# Patient Record
Sex: FEMALE | URBAN - METROPOLITAN AREA
[De-identification: names, ages, dates, MRNs, and addresses within clinical notes are randomized per-mention and may not be internally consistent; named-entity substitution may affect disease eponyms.]

---

## 2019-12-20 ENCOUNTER — IMPORTED ENCOUNTER (OUTPATIENT)
Dept: URBAN - METROPOLITAN AREA CLINIC 1 | Facility: CLINIC | Age: 44
End: 2019-12-20

## 2019-12-20 PROBLEM — H52.223: Noted: 2019-12-20

## 2019-12-20 PROBLEM — H52.13: Noted: 2019-12-20

## 2019-12-20 PROCEDURE — S0620 ROUTINE OPHTHALMOLOGICAL EXA: HCPCS

## 2019-12-20 NOTE — PATIENT DISCUSSION
1. Myopia: Rx was given for correction if indicated and requested. 2. Astigmatism OU- MRX finalized today and CTL trials given. Return for an appointment in 2 weeks CC with Dr. Dukes.

## 2021-06-05 ENCOUNTER — IMPORTED ENCOUNTER (OUTPATIENT)
Dept: URBAN - METROPOLITAN AREA CLINIC 1 | Facility: CLINIC | Age: 46
End: 2021-06-05

## 2021-06-05 PROBLEM — H01.001: Noted: 2021-06-05

## 2021-06-05 PROBLEM — H01.004: Noted: 2021-06-05

## 2021-06-05 PROBLEM — H52.223: Noted: 2021-06-05

## 2021-06-05 PROBLEM — H44.23: Noted: 2021-06-05

## 2021-06-05 PROBLEM — H52.4: Noted: 2021-06-05

## 2021-06-05 PROCEDURE — S0621 ROUTINE OPHTHALMOLOGICAL EXA: HCPCS

## 2021-06-05 NOTE — PATIENT DISCUSSION
1. Myopia- Rx was given for correction if indicated and requested. 2. Astigmatism OU3. Presbyopia4. HANK OU- Recommend the frequent use of AT's BID OU routinely. (sample given of Refresh Relivea for CTL)5. Anterior Blepharitis OU- Daily Hot compresses and lid scrubs were recommended. Finalized CTL MRx today (Consider Ultra Toric MF)Return for an appointment in 1 year 40/CC with Dr. Michaela Villeda.

## 2022-04-02 ASSESSMENT — VISUAL ACUITY
OS_SC: 20/20
OS_SC: 20/20
OD_SC: 20/20
OS_CC: J1+
OD_SC: 20/20
OS_CC: J1
OD_CC: J1
OD_CC: J1+

## 2022-04-02 ASSESSMENT — TONOMETRY
OS_IOP_MMHG: 16
OS_IOP_MMHG: 17
OD_IOP_MMHG: 16
OD_IOP_MMHG: 16

## 2022-09-06 ENCOUNTER — COMPREHENSIVE EXAM (OUTPATIENT)
Dept: URBAN - METROPOLITAN AREA CLINIC 1 | Facility: CLINIC | Age: 47
End: 2022-09-06

## 2022-09-06 DIAGNOSIS — H52.223: ICD-10-CM

## 2022-09-06 DIAGNOSIS — H52.4: ICD-10-CM

## 2022-09-06 DIAGNOSIS — H44.23: ICD-10-CM

## 2022-09-06 DIAGNOSIS — Z01.00: ICD-10-CM

## 2022-09-06 PROCEDURE — 92015 DETERMINE REFRACTIVE STATE: CPT

## 2022-09-06 PROCEDURE — 92014 COMPRE OPH EXAM EST PT 1/>: CPT

## 2022-09-06 PROCEDURE — 92310 CONTACT LENS FITTING OU: CPT

## 2022-09-06 ASSESSMENT — VISUAL ACUITY
OD_CC: 20/20
OD_CC: J2
OS_CC: 20/20
OS_CC: J2

## 2022-09-06 ASSESSMENT — TONOMETRY
OS_IOP_MMHG: 16
OD_IOP_MMHG: 16

## 2022-09-06 NOTE — PATIENT DISCUSSION
Patient given Rx for glasses and contacts. Finalized Biofinity Toric CTL today. Patient happy with Comfort and VA & ok with wearing Readers over CTL's.

## 2023-09-07 ENCOUNTER — COMPREHENSIVE EXAM (OUTPATIENT)
Dept: URBAN - METROPOLITAN AREA CLINIC 1 | Facility: CLINIC | Age: 48
End: 2023-09-07

## 2023-09-07 DIAGNOSIS — H52.13: ICD-10-CM

## 2023-09-07 DIAGNOSIS — H52.223: ICD-10-CM

## 2023-09-07 DIAGNOSIS — H52.4: ICD-10-CM

## 2023-09-07 DIAGNOSIS — Z01.00: ICD-10-CM

## 2023-09-07 DIAGNOSIS — Z46.0: ICD-10-CM

## 2023-09-07 PROCEDURE — 92310-3 LEVEL 3 CONTACT LENS MANAGEMENT

## 2023-09-07 PROCEDURE — 92014 COMPRE OPH EXAM EST PT 1/>: CPT

## 2023-09-07 PROCEDURE — 92015 DETERMINE REFRACTIVE STATE: CPT

## 2023-09-07 ASSESSMENT — VISUAL ACUITY
OD_CC: 20/20
OS_CC: J2
OD_CC: J2
OS_CC: 20/25+2

## 2023-09-07 ASSESSMENT — TONOMETRY
OS_IOP_MMHG: 14
OD_IOP_MMHG: 14

## 2025-05-08 ENCOUNTER — COMPREHENSIVE EXAM (OUTPATIENT)
Age: 50
End: 2025-05-08

## 2025-05-08 DIAGNOSIS — Z01.00: ICD-10-CM

## 2025-05-08 DIAGNOSIS — H52.13: ICD-10-CM

## 2025-05-08 DIAGNOSIS — H52.4: ICD-10-CM

## 2025-05-08 DIAGNOSIS — H52.223: ICD-10-CM

## 2025-05-08 DIAGNOSIS — Z46.0: ICD-10-CM

## 2025-05-08 PROCEDURE — 92310-3 LEVEL 3 SOFT LENS UPDATE

## 2025-05-08 PROCEDURE — 92015 DETERMINE REFRACTIVE STATE: CPT

## 2025-05-08 PROCEDURE — 92014 COMPRE OPH EXAM EST PT 1/>: CPT
